# Patient Record
Sex: MALE | Race: WHITE | NOT HISPANIC OR LATINO | Employment: FULL TIME | URBAN - METROPOLITAN AREA
[De-identification: names, ages, dates, MRNs, and addresses within clinical notes are randomized per-mention and may not be internally consistent; named-entity substitution may affect disease eponyms.]

---

## 2023-10-25 ENCOUNTER — OFFICE VISIT (OUTPATIENT)
Dept: INTERNAL MEDICINE | Facility: CLINIC | Age: 28
End: 2023-10-25
Payer: COMMERCIAL

## 2023-10-25 VITALS
OXYGEN SATURATION: 98 % | HEART RATE: 84 BPM | HEIGHT: 75 IN | DIASTOLIC BLOOD PRESSURE: 98 MMHG | WEIGHT: 315 LBS | TEMPERATURE: 98.2 F | SYSTOLIC BLOOD PRESSURE: 150 MMHG | BODY MASS INDEX: 39.17 KG/M2

## 2023-10-25 DIAGNOSIS — Z11.59 NEED FOR HEPATITIS C SCREENING TEST: ICD-10-CM

## 2023-10-25 DIAGNOSIS — Z88.9 HISTORY OF SEVERE ALLERGIC REACTION: ICD-10-CM

## 2023-10-25 DIAGNOSIS — I10 PRIMARY HYPERTENSION: Primary | ICD-10-CM

## 2023-10-25 DIAGNOSIS — S46.912A STRAIN OF LEFT SHOULDER, INITIAL ENCOUNTER: ICD-10-CM

## 2023-10-25 RX ORDER — EPINEPHRINE 0.3 MG/.3ML
0.3 INJECTION SUBCUTANEOUS ONCE
Qty: 2 EACH | Refills: 1 | Status: SHIPPED | OUTPATIENT
Start: 2023-10-25 | End: 2023-10-25

## 2023-10-25 RX ORDER — TELMISARTAN 40 MG/1
40 TABLET ORAL DAILY
Qty: 30 TABLET | Refills: 1 | Status: SHIPPED | OUTPATIENT
Start: 2023-10-25

## 2023-10-25 NOTE — PROGRESS NOTES
"Subjective   Adis Wetzel is a 28 y.o. male.     Chief Complaint   Patient presents with    Allergies     Refill Epi pen      Hypertension     Restart medication.  Not taken in about 1.5 years.  Previously took telmasartan      Shoulder Pain     2 weeks.  No known injury.  Left shoulder pain.  Recently started working out       Visit Vitals  /98 (BP Location: Right arm, Patient Position: Sitting, Cuff Size: Adult)   Pulse 84   Temp 98.2 °F (36.8 °C)   Ht 189.2 cm (74.5\")   Wt (!) 143 kg (316 lb)   SpO2 98%   BMI 40.03 kg/m²         Allergic Reaction  This is a recurrent problem. Episode onset: 12 yrs ago. The problem occurs rarely. The problem is severe. The patient was exposed to an antibiotic and nuts (peanuts, dates, Cashews). The time of exposure was just prior to onset. Associated symptoms include difficulty breathing, itching, a rash, stridor, trouble swallowing and wheezing. Pertinent negatives include no abdominal pain, chest pain, chest pressure, coughing, diarrhea, drooling, eye itching, eye redness, eye watering, globus sensation, hyperventilation or vomiting. Swelling is present on the face and hands. Past treatments include diphenhydramine. The treatment provided significant relief. His past medical history is significant for food allergies and medication allergies.   Hypertension  This is a chronic problem. The current episode started more than 1 year ago. The problem is unchanged. The problem is uncontrolled. Pertinent negatives include no anxiety, blurred vision, chest pain, headaches, malaise/fatigue, neck pain, orthopnea, palpitations, peripheral edema, PND, shortness of breath or sweats. There are no associated agents to hypertension. Risk factors for coronary artery disease include obesity. Past treatments include angiotensin blockers. Current antihypertension treatment includes nothing. The current treatment provides significant improvement. Compliance problems include psychosocial issues. "  There is no history of angina, kidney disease, CAD/MI, CVA, heart failure, left ventricular hypertrophy, PVD or retinopathy. There is no history of sleep apnea or a thyroid problem.   Shoulder Injury   The incident occurred at the gym. The left shoulder is affected. The incident occurred more than 1 week ago (2 weeks ago). The injury mechanism was repetitive motion and overhead work. The quality of the pain is described as aching. The pain radiates to the left arm. The pain is mild. Pertinent negatives include no chest pain, muscle weakness, numbness or tingling. Exacerbated by: sitting raising shoulders at computer. He has tried nothing for the symptoms. The treatment provided no relief.     Pt declines PT.   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  Pt was on Micardis for HTN in 2020 and his primary doctor closed and he was off of medication for 1.5 yrs.      Pt had enlarged thryroid when he reduced salt intake.   Pt declines vaccines at this time  The following portions of the patient's history were reviewed and updated as appropriate: allergies, current medications, past family history, past medical history, past social history, past surgical history, and problem list.    Past Medical History:   Diagnosis Date    Allergic As child    Anaphylactic allergic reaction to peanuts, dates, and cashews.    Hypertension 06/01/2020    Was previously prescribed Telmisartan  40mg    Personal history of COVID-19 12/2021      Past Surgical History:   Procedure Laterality Date    WISDOM TOOTH EXTRACTION        Family History   Problem Relation Age of Onset    Hypertension Father     Hyperlipidemia Father     Anxiety disorder Brother     Heart disease Maternal Grandfather         Had bypass surgery in his mid 50's.      Social History     Socioeconomic History    Marital status: Single   Tobacco Use    Smoking status: Former     Packs/day: 1.00     Years: 3.00     Additional pack years: 0.00     Total pack years:  3.00     Types: Cigarettes     Start date: 2013     Quit date: 2016     Years since quittin.4    Tobacco comments:     Currently vapes minimal   Substance and Sexual Activity    Alcohol use: Never    Drug use: Never    Sexual activity: Never      Allergies   Allergen Reactions    Cashew Nut Anaphylaxis    Date Seed Extract [Zizyphus Jujuba] Anaphylaxis     Dates    Peanut-Containing Drug Products Anaphylaxis    Augmentin [Amoxicillin-Pot Clavulanate] Unknown - Low Severity     Childhood     Bactrim [Sulfamethoxazole-Trimethoprim] Unknown - Low Severity     childhood    Ceclor [Cefaclor] Unknown - Low Severity     childhood    Cedax [Ceftibuten] Unknown - Low Severity     childhood       Review of Systems   Constitutional:  Negative for malaise/fatigue.   HENT:  Positive for trouble swallowing. Negative for drooling.    Eyes:  Negative for blurred vision, redness and itching.   Respiratory:  Positive for wheezing and stridor. Negative for cough and shortness of breath.    Cardiovascular:  Negative for chest pain, palpitations, orthopnea and PND.   Gastrointestinal:  Negative for abdominal pain, diarrhea and vomiting.   Musculoskeletal:  Negative for neck pain.   Skin:  Positive for itching and rash.   Allergic/Immunologic: Positive for food allergies.   Neurological:  Negative for tingling, numbness and headaches.       Objective   Physical Exam  Vitals and nursing note reviewed.   Constitutional:       Appearance: He is well-developed.   HENT:      Head: Normocephalic.      Right Ear: External ear normal.      Left Ear: External ear normal.      Nose: Nose normal.   Eyes:      General: Lids are normal.      Conjunctiva/sclera: Conjunctivae normal.      Pupils: Pupils are equal, round, and reactive to light.   Neck:      Thyroid: No thyroid mass or thyromegaly.      Vascular: No carotid bruit.      Trachea: Trachea normal.   Cardiovascular:      Rate and Rhythm: Normal rate and regular rhythm.      Heart  sounds: No murmur heard.  Pulmonary:      Effort: Pulmonary effort is normal. No respiratory distress.      Breath sounds: Normal breath sounds. No decreased breath sounds, wheezing, rhonchi or rales.   Chest:      Chest wall: No tenderness.   Abdominal:      General: Bowel sounds are normal.      Palpations: Abdomen is soft.      Tenderness: There is no abdominal tenderness.   Musculoskeletal:         General: Normal range of motion.      Left shoulder: No swelling, deformity or tenderness. Normal range of motion.      Cervical back: Normal range of motion and neck supple.   Skin:     General: Skin is warm and dry.   Neurological:      Mental Status: He is alert and oriented to person, place, and time.   Psychiatric:         Behavior: Behavior normal.         Assessment & Plan   Problems Addressed this Visit          Cardiac and Vasculature    Hypertension - Primary    Relevant Medications    EPINEPHrine (EpiPen 2-Tripp) 0.3 MG/0.3ML solution auto-injector injection    telmisartan (MICARDIS) 40 MG tablet    Other Relevant Orders    TSH Rfx On Abnormal To Free T4    Comprehensive Metabolic Panel    Lipid Panel    CBC & Differential       Musculoskeletal and Injuries    RESOLVED: Strain of left shoulder     Other Visit Diagnoses       History of severe allergic reaction        Relevant Medications    EPINEPHrine (EpiPen 2-Tripp) 0.3 MG/0.3ML solution auto-injector injection    Other Relevant Orders    Ambulatory Referral to Allergy (Completed)    Need for hepatitis C screening test        Relevant Orders    HCV Antibody Rfx To Qnt PCR          Diagnoses         Codes Comments    Primary hypertension    -  Primary ICD-10-CM: I10  ICD-9-CM: 401.9     History of severe allergic reaction     ICD-10-CM: Z88.9  ICD-9-CM: V15.09     Strain of left shoulder, initial encounter     ICD-10-CM: S46.912A  ICD-9-CM: 840.9     Need for hepatitis C screening test     ICD-10-CM: Z11.59  ICD-9-CM: V73.89                        Current  Outpatient Medications:     EPINEPHrine (EpiPen 2-Tripp) 0.3 MG/0.3ML solution auto-injector injection, Inject 0.3 mL into the appropriate muscle as directed by prescriber 1 (One) Time for 1 dose., Disp: 2 each, Rfl: 1    telmisartan (MICARDIS) 40 MG tablet, Take 1 tablet by mouth Daily., Disp: 30 tablet, Rfl: 1    Return in about 4 weeks (around 11/22/2023), or if symptoms worsen or fail to improve, for Recheck bp with nurse 1 week.

## 2023-11-02 ENCOUNTER — TELEPHONE (OUTPATIENT)
Dept: INTERNAL MEDICINE | Facility: CLINIC | Age: 28
End: 2023-11-02
Payer: COMMERCIAL

## 2023-11-02 NOTE — TELEPHONE ENCOUNTER
Caller: Adis Wetzel    Relationship: Self    Best call back number: [phone]     PATIENT IS ON A NEW BLOOD PRESSURE MEDICATION AND WAS ADVISE TO CALL BACK WITH HIS READINGS. AVERAGE FOR THE LAST THREE OF DAYS IS: 124/83

## 2023-11-10 ENCOUNTER — TELEPHONE (OUTPATIENT)
Dept: INTERNAL MEDICINE | Facility: CLINIC | Age: 28
End: 2023-11-10
Payer: COMMERCIAL

## 2023-11-10 NOTE — TELEPHONE ENCOUNTER
Caller: Adis Wetzel    Relationship: Self    Best call back number: [phone]     What orders are you requesting (i.e. lab or imaging): LAB WORK ORIGINALLY ORDERED ON 10/25/23    Where will you receive your lab/imaging services:   [facility]  [address] [facility] [address] [place] [address]    PH: [phone]  FAX: [phone]    Additional notes: PLEASE ADVISE PATIENT WHEN THE LAB ORDERS HAVE BEEN SENT TO [facility] OR IF THERE ARE ANY QUESTIONS/CONCERNS.

## 2023-11-14 LAB
ALBUMIN SERPL-MCNC: 5 G/DL (ref 4.3–5.2)
ALBUMIN/GLOB SERPL: 2.1 {RATIO} (ref 1.2–2.2)
ALP SERPL-CCNC: 52 IU/L (ref 44–121)
ALT SERPL-CCNC: 31 IU/L (ref 0–44)
AST SERPL-CCNC: 24 IU/L (ref 0–40)
BASOPHILS # BLD AUTO: 0.1 X10E3/UL (ref 0–0.2)
BASOPHILS NFR BLD AUTO: 1 %
BILIRUB SERPL-MCNC: 0.7 MG/DL (ref 0–1.2)
BUN SERPL-MCNC: 11 MG/DL (ref 6–20)
BUN/CREAT SERPL: 11 (ref 9–20)
CALCIUM SERPL-MCNC: 10.2 MG/DL (ref 8.7–10.2)
CHLORIDE SERPL-SCNC: 98 MMOL/L (ref 96–106)
CHOLEST SERPL-MCNC: 207 MG/DL (ref 100–199)
CO2 SERPL-SCNC: 26 MMOL/L (ref 20–29)
CREAT SERPL-MCNC: 1.03 MG/DL (ref 0.76–1.27)
EGFRCR SERPLBLD CKD-EPI 2021: 100 ML/MIN/1.73
EOSINOPHIL # BLD AUTO: 0.4 X10E3/UL (ref 0–0.4)
EOSINOPHIL NFR BLD AUTO: 8 %
ERYTHROCYTE [DISTWIDTH] IN BLOOD BY AUTOMATED COUNT: 12 % (ref 11.6–15.4)
GLOBULIN SER CALC-MCNC: 2.4 G/DL (ref 1.5–4.5)
GLUCOSE SERPL-MCNC: 92 MG/DL (ref 70–99)
HCT VFR BLD AUTO: 48.4 % (ref 37.5–51)
HCV AB SERPL QL IA: NORMAL
HCV IGG SERPL QL IA: NON REACTIVE
HDLC SERPL-MCNC: 44 MG/DL
HGB BLD-MCNC: 16.6 G/DL (ref 13–17.7)
IMM GRANULOCYTES # BLD AUTO: 0 X10E3/UL (ref 0–0.1)
IMM GRANULOCYTES NFR BLD AUTO: 0 %
LDLC SERPL CALC-MCNC: 147 MG/DL (ref 0–99)
LYMPHOCYTES # BLD AUTO: 1.8 X10E3/UL (ref 0.7–3.1)
LYMPHOCYTES NFR BLD AUTO: 38 %
MCH RBC QN AUTO: 30.9 PG (ref 26.6–33)
MCHC RBC AUTO-ENTMCNC: 34.3 G/DL (ref 31.5–35.7)
MCV RBC AUTO: 90 FL (ref 79–97)
MONOCYTES # BLD AUTO: 0.6 X10E3/UL (ref 0.1–0.9)
MONOCYTES NFR BLD AUTO: 13 %
NEUTROPHILS # BLD AUTO: 1.8 X10E3/UL (ref 1.4–7)
NEUTROPHILS NFR BLD AUTO: 40 %
PLATELET # BLD AUTO: 221 X10E3/UL (ref 150–450)
POTASSIUM SERPL-SCNC: 4.9 MMOL/L (ref 3.5–5.2)
PROT SERPL-MCNC: 7.4 G/DL (ref 6–8.5)
RBC # BLD AUTO: 5.37 X10E6/UL (ref 4.14–5.8)
SODIUM SERPL-SCNC: 137 MMOL/L (ref 134–144)
TRIGL SERPL-MCNC: 87 MG/DL (ref 0–149)
TSH SERPL DL<=0.005 MIU/L-ACNC: 2.59 UIU/ML (ref 0.45–4.5)
VLDLC SERPL CALC-MCNC: 16 MG/DL (ref 5–40)
WBC # BLD AUTO: 4.6 X10E3/UL (ref 3.4–10.8)

## 2023-11-16 ENCOUNTER — OFFICE VISIT (OUTPATIENT)
Dept: INTERNAL MEDICINE | Facility: CLINIC | Age: 28
End: 2023-11-16
Payer: COMMERCIAL

## 2023-11-16 VITALS
SYSTOLIC BLOOD PRESSURE: 140 MMHG | BODY MASS INDEX: 38.42 KG/M2 | TEMPERATURE: 98.4 F | WEIGHT: 309 LBS | OXYGEN SATURATION: 97 % | DIASTOLIC BLOOD PRESSURE: 98 MMHG | HEIGHT: 75 IN | HEART RATE: 75 BPM

## 2023-11-16 DIAGNOSIS — I10 PRIMARY HYPERTENSION: ICD-10-CM

## 2023-11-16 PROCEDURE — 99213 OFFICE O/P EST LOW 20 MIN: CPT | Performed by: FAMILY MEDICINE

## 2023-11-16 RX ORDER — TELMISARTAN 40 MG/1
40 TABLET ORAL DAILY
Qty: 90 TABLET | Refills: 0 | Status: SHIPPED | OUTPATIENT
Start: 2023-11-16

## 2023-11-16 RX ORDER — EPINEPHRINE 0.3 MG/.3ML
INJECTION SUBCUTANEOUS SEE ADMIN INSTRUCTIONS
COMMUNITY
Start: 2023-10-25

## 2023-11-16 NOTE — PROGRESS NOTES
"Subjective   Adis Wetzel is a 28 y.o. male.     Chief Complaint   Patient presents with    Hypertension     4 week follow up    Allergies    Shoulder Pain       Visit Vitals  /98 (BP Location: Left arm, Patient Position: Sitting, Cuff Size: Adult)   Pulse 75   Temp 98.4 °F (36.9 °C)   Ht 189.2 cm (74.5\")   Wt (!) 140 kg (309 lb)   SpO2 97%   BMI 39.14 kg/m²       Wt Readings from Last 3 Encounters:   11/16/23 (!) 140 kg (309 lb)   10/25/23 (!) 143 kg (316 lb)         Hypertension  This is a chronic problem. The current episode started more than 1 year ago. The problem has been gradually improving since onset. The problem is controlled. Pertinent negatives include no anxiety, blurred vision, chest pain, headaches, malaise/fatigue, neck pain, orthopnea, palpitations, peripheral edema, PND, shortness of breath or sweats. There are no associated agents to hypertension. Risk factors for coronary artery disease include dyslipidemia, male gender and obesity. Current antihypertension treatment includes angiotensin blockers. The current treatment provides significant improvement. There are no compliance problems.  There is no history of angina, kidney disease, CAD/MI, CVA, heart failure, left ventricular hypertrophy, PVD or retinopathy. There is no history of sleep apnea or a thyroid problem.      Pt states that his blood pressure at home is running normal and brings his list.   Pt has pending allergy appt in January. Pt has hx of anaphylaxis.   Pt states that his shoulder pain has resolved.   Pt has changed his diet.   The following portions of the patient's history were reviewed and updated as appropriate: allergies, current medications, past family history, past medical history, past social history, past surgical history, and problem list.    Past Medical History:   Diagnosis Date    Allergic As child    Anaphylactic allergic reaction to peanuts, dates, and cashews.    Hypertension 06/01/2020    Was previously " prescribed Telmisartan  40mg    Personal history of COVID-19 2021      Past Surgical History:   Procedure Laterality Date    WISDOM TOOTH EXTRACTION        Family History   Problem Relation Age of Onset    Hypertension Father     Hyperlipidemia Father     Anxiety disorder Brother     Heart disease Maternal Grandfather         Had bypass surgery in his mid 50's.      Social History     Socioeconomic History    Marital status: Single   Tobacco Use    Smoking status: Former     Packs/day: 1.00     Years: 3.00     Additional pack years: 0.00     Total pack years: 3.00     Types: Cigarettes     Start date: 2013     Quit date: 2016     Years since quittin.4    Tobacco comments:     Currently vapes minimal   Substance and Sexual Activity    Alcohol use: Never    Drug use: Never    Sexual activity: Never      Allergies   Allergen Reactions    Cashew Nut Anaphylaxis    Date Seed Extract [Zizyphus Jujuba] Anaphylaxis     Dates    Peanut-Containing Drug Products Anaphylaxis    Augmentin [Amoxicillin-Pot Clavulanate] Unknown - Low Severity     Childhood     Bactrim [Sulfamethoxazole-Trimethoprim] Unknown - Low Severity     childhood    Ceclor [Cefaclor] Unknown - Low Severity     childhood    Cedax [Ceftibuten] Unknown - Low Severity     childhood       Review of Systems   Constitutional:  Negative for malaise/fatigue.   Eyes:  Negative for blurred vision.   Respiratory:  Negative for shortness of breath.    Cardiovascular:  Negative for chest pain, palpitations, orthopnea and PND.   Musculoskeletal:  Negative for neck pain.   Neurological:  Negative for headaches.       Objective   Physical Exam  Vitals and nursing note reviewed.   Constitutional:       Appearance: He is well-developed.   HENT:      Head: Normocephalic.      Right Ear: External ear normal.      Left Ear: External ear normal.      Nose: Nose normal.   Eyes:      Conjunctiva/sclera: Conjunctivae normal.      Pupils: Pupils are equal, round, and  reactive to light.   Cardiovascular:      Rate and Rhythm: Normal rate and regular rhythm.      Heart sounds: No murmur heard.  Pulmonary:      Effort: Pulmonary effort is normal.      Breath sounds: Normal breath sounds.   Abdominal:      General: Bowel sounds are normal.      Palpations: Abdomen is soft.   Musculoskeletal:         General: Normal range of motion.      Cervical back: Normal range of motion and neck supple.   Skin:     General: Skin is warm and dry.   Neurological:      Mental Status: He is alert and oriented to person, place, and time.   Psychiatric:         Behavior: Behavior normal.         Assessment & Plan   Problems Addressed this Visit          Cardiac and Vasculature    Hypertension    Relevant Medications    EPINEPHrine (EPIPEN) 0.3 MG/0.3ML solution auto-injector injection    telmisartan (MICARDIS) 40 MG tablet     Diagnoses         Codes Comments    Primary hypertension     ICD-10-CM: I10  ICD-9-CM: 401.9           Pt to track his BP at home.   Get flu shot at pharmacy  Continue to work on weigh loss           Current Outpatient Medications:     EPINEPHrine (EPIPEN) 0.3 MG/0.3ML solution auto-injector injection, Inject  into the appropriate muscle as directed by prescriber See Admin Instructions. inject syringe as directed for allergic reaction, Disp: , Rfl:     telmisartan (MICARDIS) 40 MG tablet, Take 1 tablet by mouth Daily., Disp: 90 tablet, Rfl: 0    Return in about 3 months (around 2/16/2024), or if symptoms worsen or fail to improve, for Recheck bp.  Answers submitted by the patient for this visit:  Primary Reason for Visit (Submitted on 11/15/2023)  What is the primary reason for your visit?: Other  Other (Submitted on 11/15/2023)  Please describe your symptoms.: No symptoms, this is a follow up appointment after taking BP meds for 3 weeks.  Have you had these symptoms before?: Yes  How long have you been having these symptoms?: Greater than 2 weeks  Please list any medications  you are currently taking for this condition.: Telmisartan 40mg    Recent Results (from the past 168 hour(s))   CBC & Differential    Collection Time: 11/13/23  8:42 AM   Result Value Ref Range    WBC 4.6 3.4 - 10.8 x10E3/uL    RBC 5.37 4.14 - 5.80 x10E6/uL    Hemoglobin 16.6 13.0 - 17.7 g/dL    Hematocrit 48.4 37.5 - 51.0 %    MCV 90 79 - 97 fL    MCH 30.9 26.6 - 33.0 pg    MCHC 34.3 31.5 - 35.7 g/dL    RDW 12.0 11.6 - 15.4 %    Platelets 221 150 - 450 x10E3/uL    Neutrophil Rel % 40 Not Estab. %    Lymphocyte Rel % 38 Not Estab. %    Monocyte Rel % 13 Not Estab. %    Eosinophil Rel % 8 Not Estab. %    Basophil Rel % 1 Not Estab. %    Neutrophils Absolute 1.8 1.4 - 7.0 x10E3/uL    Lymphocytes Absolute 1.8 0.7 - 3.1 x10E3/uL    Monocytes Absolute 0.6 0.1 - 0.9 x10E3/uL    Eosinophils Absolute 0.4 0.0 - 0.4 x10E3/uL    Basophils Absolute 0.1 0.0 - 0.2 x10E3/uL    Immature Granulocyte Rel % 0 Not Estab. %    Immature Grans Absolute 0.0 0.0 - 0.1 x10E3/uL   Comprehensive Metabolic Panel    Collection Time: 11/13/23  8:42 AM   Result Value Ref Range    Glucose 92 70 - 99 mg/dL    BUN 11 6 - 20 mg/dL    Creatinine 1.03 0.76 - 1.27 mg/dL    EGFR Result 100 >59 mL/min/1.73    BUN/Creatinine Ratio 11 9 - 20    Sodium 137 134 - 144 mmol/L    Potassium 4.9 3.5 - 5.2 mmol/L    Chloride 98 96 - 106 mmol/L    Total CO2 26 20 - 29 mmol/L    Calcium 10.2 8.7 - 10.2 mg/dL    Total Protein 7.4 6.0 - 8.5 g/dL    Albumin 5.0 4.3 - 5.2 g/dL    Globulin 2.4 1.5 - 4.5 g/dL    A/G Ratio 2.1 1.2 - 2.2    Total Bilirubin 0.7 0.0 - 1.2 mg/dL    Alkaline Phosphatase 52 44 - 121 IU/L    AST (SGOT) 24 0 - 40 IU/L    ALT (SGPT) 31 0 - 44 IU/L   Lipid Panel    Collection Time: 11/13/23  8:42 AM   Result Value Ref Range    Total Cholesterol 207 (H) 100 - 199 mg/dL    Triglycerides 87 0 - 149 mg/dL    HDL Cholesterol 44 >39 mg/dL    VLDL Cholesterol Gabriel 16 5 - 40 mg/dL    LDL Chol Calc (NIH) 147 (H) 0 - 99 mg/dL   HCV Antibody Rfx To Qnt PCR     Collection Time: 11/13/23  8:42 AM   Result Value Ref Range    Hepatitis C Ab Non Reactive Non Reactive   Interpretation:    Collection Time: 11/13/23  8:42 AM   Result Value Ref Range    Interpretation Comment    TSH Rfx On Abnormal To Free T4    Collection Time: 11/13/23  8:42 AM   Result Value Ref Range    TSH 2.590 0.450 - 4.500 uIU/mL

## 2024-02-15 ENCOUNTER — OFFICE VISIT (OUTPATIENT)
Dept: INTERNAL MEDICINE | Facility: CLINIC | Age: 29
End: 2024-02-15
Payer: COMMERCIAL

## 2024-02-15 VITALS
OXYGEN SATURATION: 98 % | HEART RATE: 72 BPM | BODY MASS INDEX: 38.15 KG/M2 | WEIGHT: 306.8 LBS | TEMPERATURE: 96.8 F | SYSTOLIC BLOOD PRESSURE: 140 MMHG | DIASTOLIC BLOOD PRESSURE: 88 MMHG | HEIGHT: 75 IN

## 2024-02-15 DIAGNOSIS — I10 PRIMARY HYPERTENSION: ICD-10-CM

## 2024-02-15 PROCEDURE — 99213 OFFICE O/P EST LOW 20 MIN: CPT | Performed by: FAMILY MEDICINE

## 2024-02-15 RX ORDER — TELMISARTAN 40 MG/1
40 TABLET ORAL DAILY
Qty: 90 TABLET | Refills: 1 | Status: SHIPPED | OUTPATIENT
Start: 2024-02-15

## 2024-08-12 DIAGNOSIS — I10 PRIMARY HYPERTENSION: ICD-10-CM

## 2024-08-12 RX ORDER — TELMISARTAN 40 MG/1
40 TABLET ORAL DAILY
Qty: 90 TABLET | Refills: 0 | Status: SHIPPED | OUTPATIENT
Start: 2024-08-12

## 2024-08-12 NOTE — TELEPHONE ENCOUNTER
Rx Refill Note  Requested Prescriptions     Pending Prescriptions Disp Refills    telmisartan (MICARDIS) 40 MG tablet 90 tablet 1     Sig: Take 1 tablet by mouth Daily.      Last office visit with prescribing clinician: 2/15/2024   Last telemedicine visit with prescribing clinician: Visit date not found   Next office visit with prescribing clinician: 1/16/2025       Lety Huerta MA  08/12/24, 14:59 EDT

## 2024-08-12 NOTE — TELEPHONE ENCOUNTER
Caller: Adis Wetzel    Relationship: Self    Best call back number: [phone]    Requested Prescriptions:   Requested Prescriptions     Pending Prescriptions Disp Refills    telmisartan (MICARDIS) 40 MG tablet 90 tablet 1     Sig: Take 1 tablet by mouth Daily.        Pharmacy where request should be sent: [id number] - [place], [place] - [address] [facility] [address] - [phone]  - [phone] FX     Last office visit with prescribing clinician: 2/15/2024   Last telemedicine visit with prescribing clinician: Visit date not found   Next office visit with prescribing clinician: 1/16/2025     Additional details provided by patient: THE PATIENT HAS 11 DAYS LEFT     Does the patient have less than a 3 day supply:  [] Yes  [x] No    Would you like a call back once the refill request has been completed: [] Yes [x] No    If the office needs to give you a call back, can they leave a voicemail: [] Yes [x] No    Michelle Durand Rep   08/12/24 13:48 EDT

## 2024-09-23 ENCOUNTER — TELEPHONE (OUTPATIENT)
Dept: INTERNAL MEDICINE | Facility: CLINIC | Age: 29
End: 2024-09-23
Payer: COMMERCIAL

## 2024-09-23 DIAGNOSIS — I10 PRIMARY HYPERTENSION: Primary | ICD-10-CM

## 2024-11-14 ENCOUNTER — TELEPHONE (OUTPATIENT)
Dept: INTERNAL MEDICINE | Facility: CLINIC | Age: 29
End: 2024-11-14
Payer: COMMERCIAL

## 2024-11-14 DIAGNOSIS — I10 PRIMARY HYPERTENSION: ICD-10-CM

## 2024-11-14 RX ORDER — TELMISARTAN 40 MG/1
40 TABLET ORAL DAILY
Qty: 90 TABLET | Refills: 0 | Status: SHIPPED | OUTPATIENT
Start: 2024-11-14

## 2024-11-14 NOTE — TELEPHONE ENCOUNTER
Follow-up potassium was normal. Lab orders have been faxed to [facility], [address] [place] [address], [place] [address], [place]  [address], Fax#: [phone].  Patient has been notified.

## 2024-11-14 NOTE — TELEPHONE ENCOUNTER
Caller: Adis Wetzel    Relationship: Self    Best call back number: [phone]     Requested Prescriptions:   Requested Prescriptions     Pending Prescriptions Disp Refills    telmisartan (MICARDIS) 40 MG tablet 90 tablet 0     Sig: Take 1 tablet by mouth Daily.        Pharmacy where request should be sent: [id number] - [address] - [phone]  - [phone] FX     Last office visit with prescribing clinician: 2/15/2024   Last telemedicine visit with prescribing clinician: Visit date not found   Next office visit with prescribing clinician: 1/16/2025     Additional details provided by patient:     Does the patient have less than a 3 day supply:  [] Yes  [] No    Would you like a call back once the refill request has been completed: [x] Yes [] No    If the office needs to give you a call back, can they leave a voicemail: [x] Yes [] No    Michelle Vidal Rep   11/14/24 10:33 EST

## 2024-11-14 NOTE — TELEPHONE ENCOUNTER
Caller: Adis Wetzel    Relationship: Self    Best call back number: [phone]     What is the best time to reach you: ANYTIME     Who are you requesting to speak with (clinical staff, provider,  specific staff member): CLINICAL STAFF    What was the call regarding: PATIENT IS CALLING TO REQUEST THAT HIS LAB ORDERS BE SENT TO [facility]     Is it okay if the provider responds through [facility]: YES

## 2024-11-14 NOTE — TELEPHONE ENCOUNTER
Rx Refill Note  Requested Prescriptions     Pending Prescriptions Disp Refills    telmisartan (MICARDIS) 40 MG tablet 90 tablet 0     Sig: Take 1 tablet by mouth Daily.      Last office visit with prescribing clinician: 2/15/2024   Last telemedicine visit with prescribing clinician: Visit date not found   Next office visit with prescribing clinician: 1/16/2025       Yumiko Grant MA  11/14/24, 17:20 EST

## 2025-02-10 DIAGNOSIS — I10 PRIMARY HYPERTENSION: ICD-10-CM

## 2025-02-10 RX ORDER — TELMISARTAN 40 MG/1
40 TABLET ORAL DAILY
Qty: 90 TABLET | Refills: 0 | Status: CANCELLED | OUTPATIENT
Start: 2025-02-10

## 2025-02-10 NOTE — TELEPHONE ENCOUNTER
Caller: Adis Wetzel    Relationship: Self    Best call back number: [phone]     Requested Prescriptions:   Requested Prescriptions     Pending Prescriptions Disp Refills    telmisartan (MICARDIS) 40 MG tablet 90 tablet 0     Sig: Take 1 tablet by mouth Daily.        Pharmacy where request should be sent: [id number] - [facility] [address] [facility] [address] - [phone]  - [phone] FX     Last office visit with prescribing clinician: 2/15/2024   Last telemedicine visit with prescribing clinician: Visit date not found   Next office visit with prescribing clinician: 5/22/2025     Additional details provided by patient: PATIENT WOULD LIKE A QUANTITY  IF POSSIBLE TO DO UNTIL HIS NEXT SCHEDULED ANNUAL PHYSICAL ON 5/22/2025 OR 1 REFILL ADDED TO THE NEW PRESCRIPTION.     Does the patient have less than a 3 day supply:  [x] Yes  [] No    Would you like a call back once the refill request has been completed: [] Yes [x] No    If the office needs to give you a call back, can they leave a voicemail: [] Yes [x] No    Michelle Bhatti Rep   02/10/25 11:08 EST

## 2025-02-10 NOTE — TELEPHONE ENCOUNTER
Left message on voicemail patient is due now for an appointment.  Last seen in February 2024 and did not follow up in 6 months like office note asked.  He will need appointment and labs for this medication to be refilled.

## 2025-02-12 DIAGNOSIS — I10 PRIMARY HYPERTENSION: ICD-10-CM

## 2025-02-12 RX ORDER — TELMISARTAN 40 MG/1
40 TABLET ORAL DAILY
Qty: 90 TABLET | Refills: 1 | Status: SHIPPED | OUTPATIENT
Start: 2025-02-12

## 2025-02-12 NOTE — TELEPHONE ENCOUNTER
Rx Refill Note  Requested Prescriptions     Pending Prescriptions Disp Refills    telmisartan (MICARDIS) 40 MG tablet 90 tablet 0     Sig: Take 1 tablet by mouth Daily.      Last office visit with prescribing clinician: 2/15/2024   Last telemedicine visit with prescribing clinician: Visit date not found   Next office visit with prescribing clinician: 5/22/2025                         Would you like a call back once the refill request has been completed: [] Yes [] No    If the office needs to give you a call back, can they leave a voicemail: [] Yes [] No    Tabitha Addison  02/12/25, 14:56 EST

## 2025-02-12 NOTE — TELEPHONE ENCOUNTER
Caller: Adis Wetzel    Relationship: Self    Best call back number: [phone]     Requested Prescriptions:   Requested Prescriptions     Pending Prescriptions Disp Refills    telmisartan (MICARDIS) 40 MG tablet 90 tablet 0     Sig: Take 1 tablet by mouth Daily.        Pharmacy where request should be sent: [id number] - [address] - [phone]  - [phone]     Last office visit with prescribing clinician: 2/15/2024   Last telemedicine visit with prescribing clinician: Visit date not found   Next office visit with prescribing clinician: 5/22/2025     Additional details provided by patient: PATIENT HAS 1 PILL LEFT. HE HAS HIS ANNUAL PHYSICAL SCHEDULED FOR 05/22/25. HE NEEDS ENOUGH MEDICATION TO GET HIM THROUGH TIL THE APPOINTMENT.     Does the patient have less than a 3 day supply:  [x] Yes  [] No    Would you like a call back once the refill request has been completed: [] Yes [x] No    If the office needs to give you a call back, can they leave a voicemail: [] Yes [x] No    Michelle Cope Rep   02/12/25 14:30 EST

## 2025-05-08 ENCOUNTER — TELEPHONE (OUTPATIENT)
Dept: INTERNAL MEDICINE | Facility: CLINIC | Age: 30
End: 2025-05-08
Payer: COMMERCIAL

## 2025-05-08 DIAGNOSIS — Z00.00 LABORATORY EXAM ORDERED AS PART OF ROUTINE GENERAL MEDICAL EXAMINATION: Primary | ICD-10-CM

## 2025-05-08 NOTE — TELEPHONE ENCOUNTER
PATIENT HAS CALLED REQUESTING IF LAB ORDERS CAN BE SENT OVER TO [email or web address] IN PREPARATION FOR HIS UPCOMING PHYSICAL. ADDRESS FOR [email or web address] IS [address] [place] [address]. FAX NUMBER [phone]. PATIENT IS REQUESTING A CALL BACK ONCE ORDERS HAVE BEEN SENT.    CALL BACK NUMBER [phone]

## 2025-05-14 LAB
ALBUMIN SERPL-MCNC: 4.6 G/DL (ref 4.1–5.1)
ALP SERPL-CCNC: 63 IU/L (ref 44–121)
ALT SERPL-CCNC: 27 IU/L (ref 0–44)
AMBIG ABBREV CMP14 DEFAULT: NORMAL
AMBIG ABBREV LP DEFAULT: NORMAL
AST SERPL-CCNC: 19 IU/L (ref 0–40)
BASOPHILS # BLD AUTO: 0.1 X10E3/UL (ref 0–0.2)
BASOPHILS NFR BLD AUTO: 1 %
BILIRUB SERPL-MCNC: 0.7 MG/DL (ref 0–1.2)
BUN SERPL-MCNC: 12 MG/DL (ref 6–20)
BUN/CREAT SERPL: 12 (ref 9–20)
CALCIUM SERPL-MCNC: 10 MG/DL (ref 8.7–10.2)
CHLORIDE SERPL-SCNC: 100 MMOL/L (ref 96–106)
CHOLEST SERPL-MCNC: 195 MG/DL (ref 100–199)
CO2 SERPL-SCNC: 25 MMOL/L (ref 20–29)
CREAT SERPL-MCNC: 1.02 MG/DL (ref 0.76–1.27)
EGFRCR SERPLBLD CKD-EPI 2021: 101 ML/MIN/1.73
EOSINOPHIL # BLD AUTO: 1.2 X10E3/UL (ref 0–0.4)
EOSINOPHIL NFR BLD AUTO: 20 %
ERYTHROCYTE [DISTWIDTH] IN BLOOD BY AUTOMATED COUNT: 12.1 % (ref 11.6–15.4)
GLOBULIN SER CALC-MCNC: 2.3 G/DL (ref 1.5–4.5)
GLUCOSE SERPL-MCNC: 94 MG/DL (ref 70–99)
HCT VFR BLD AUTO: 47.6 % (ref 37.5–51)
HDLC SERPL-MCNC: 56 MG/DL
HGB BLD-MCNC: 15.6 G/DL (ref 13–17.7)
IMM GRANULOCYTES # BLD AUTO: 0 X10E3/UL (ref 0–0.1)
IMM GRANULOCYTES NFR BLD AUTO: 0 %
LDLC SERPL CALC-MCNC: 121 MG/DL (ref 0–99)
LYMPHOCYTES # BLD AUTO: 2 X10E3/UL (ref 0.7–3.1)
LYMPHOCYTES NFR BLD AUTO: 35 %
MCH RBC QN AUTO: 31.7 PG (ref 26.6–33)
MCHC RBC AUTO-ENTMCNC: 32.8 G/DL (ref 31.5–35.7)
MCV RBC AUTO: 97 FL (ref 79–97)
MONOCYTES # BLD AUTO: 0.6 X10E3/UL (ref 0.1–0.9)
MONOCYTES NFR BLD AUTO: 11 %
NEUTROPHILS # BLD AUTO: 1.9 X10E3/UL (ref 1.4–7)
NEUTROPHILS NFR BLD AUTO: 33 %
PLATELET # BLD AUTO: 198 X10E3/UL (ref 150–450)
POTASSIUM SERPL-SCNC: 4.1 MMOL/L (ref 3.5–5.2)
PROT SERPL-MCNC: 6.9 G/DL (ref 6–8.5)
RBC # BLD AUTO: 4.92 X10E6/UL (ref 4.14–5.8)
SODIUM SERPL-SCNC: 140 MMOL/L (ref 134–144)
TRIGL SERPL-MCNC: 98 MG/DL (ref 0–149)
TSH SERPL DL<=0.005 MIU/L-ACNC: 2.86 UIU/ML (ref 0.45–4.5)
VLDLC SERPL CALC-MCNC: 18 MG/DL (ref 5–40)
WBC # BLD AUTO: 5.7 X10E3/UL (ref 3.4–10.8)

## 2025-05-15 ENCOUNTER — RESULTS FOLLOW-UP (OUTPATIENT)
Dept: INTERNAL MEDICINE | Facility: CLINIC | Age: 30
End: 2025-05-15
Payer: COMMERCIAL

## 2025-05-22 ENCOUNTER — OFFICE VISIT (OUTPATIENT)
Dept: INTERNAL MEDICINE | Facility: CLINIC | Age: 30
End: 2025-05-22
Payer: COMMERCIAL

## 2025-05-22 VITALS
SYSTOLIC BLOOD PRESSURE: 136 MMHG | HEIGHT: 75 IN | BODY MASS INDEX: 34.44 KG/M2 | DIASTOLIC BLOOD PRESSURE: 86 MMHG | HEART RATE: 77 BPM | TEMPERATURE: 97.8 F | OXYGEN SATURATION: 99 % | WEIGHT: 277 LBS

## 2025-05-22 DIAGNOSIS — Z00.00 ANNUAL PHYSICAL EXAM: Primary | ICD-10-CM

## 2025-05-22 LAB
BILIRUB BLD-MCNC: NEGATIVE MG/DL
CLARITY, POC: CLEAR
COLOR UR: YELLOW
EXPIRATION DATE: NORMAL
GLUCOSE UR STRIP-MCNC: NEGATIVE MG/DL
KETONES UR QL: NEGATIVE
LEUKOCYTE EST, POC: NEGATIVE
Lab: NORMAL
NITRITE UR-MCNC: NEGATIVE MG/ML
PH UR: 8 [PH] (ref 5–8)
PROT UR STRIP-MCNC: NEGATIVE MG/DL
RBC # UR STRIP: NEGATIVE /UL
SP GR UR: 1 (ref 1–1.03)
UROBILINOGEN UR QL: NORMAL

## 2025-05-22 NOTE — PROGRESS NOTES
"Subjective   Adis Wetzel is a 29 y.o. male.         Chief Complaint   Patient presents with    Annual Exam       Visit Vitals  /86 (BP Location: Left arm, Patient Position: Sitting, Cuff Size: Adult)   Pulse 77   Temp 97.8 °F (36.6 °C)   Ht 189.2 cm (74.5\")   Wt 126 kg (277 lb)   SpO2 99%   BMI 35.09 kg/m²       Wt Readings from Last 3 Encounters:   05/22/25 126 kg (277 lb)   02/15/24 (!) 139 kg (306 lb 12.8 oz)   11/16/23 (!) 140 kg (309 lb)         Hypertension  Chronicity:  Chronic  Onset:  More than 1 year ago  Progression since onset:  Stable  Condition status:  Controlled  Associated symptoms: no anxiety, no blurred vision, no chest pain, no headaches, no malaise/fatigue, no neck pain, no orthopnea, no palpitations, no peripheral edema, no PND, no shortness of breath, no sweats, no dyspnea with exertion and no dizziness    Agents associated with hypertension:  No associated agents  CAD risks:  Male gender and obesity  Past treatments:  Angiotensin blockers  Current therapy:  Angiotensin blockers  Improvement on treatment:  Significant  Compliance problems:  No compliance problems  End-organ damage: no angina, no kidney disease, no CAD/MI, no CVA, no heart failure, no left ventricular hypertrophy, no PVD and no retinopathy    Identifiable causes: no sleep apnea and no thyroid problem       Pt here for annual exam.  Pt is working on weight loss and is exercising regularly and tracking blood sugars, which have been normal. Pt's fasting sugars the past 3 months have been 87. /77 at home.    The following portions of the patient's history were reviewed and updated as appropriate: allergies, current medications, past family history, past medical history, past social history, past surgical history, and problem list.    Past Medical History:   Diagnosis Date    Allergic As child    Anaphylactic allergic reaction to peanuts, dates, and cashews.    Hypertension 06/01/2020    Was previously prescribed " Telmisartan  40mg    Personal history of COVID-19 2021      Past Surgical History:   Procedure Laterality Date    WISDOM TOOTH EXTRACTION        Family History   Problem Relation Age of Onset    Hypertension Father     Hyperlipidemia Father     Anxiety disorder Brother     Colon cancer Maternal Aunt         , radiation exposure    Heart disease Maternal Grandfather         Had bypass surgery in his mid 50's.      Social History     Socioeconomic History    Marital status: Single   Tobacco Use    Smoking status: Former     Current packs/day: 0.00     Average packs/day: 1 pack/day for 3.4 years (3.4 ttl pk-yrs)     Types: Cigarettes     Start date: 2013     Quit date: 2016     Years since quittin.9    Smokeless tobacco: Former    Tobacco comments:        Vaping Use    Vaping status: Former    Substances: Nicotine    Devices: Refillable tank   Substance and Sexual Activity    Alcohol use: Never    Drug use: Never    Sexual activity: Never      Allergies   Allergen Reactions    Cashew Nut Anaphylaxis    Date Seed Extract [Zizyphus Jujuba] Anaphylaxis     Dates    Peanut-Containing Drug Products Anaphylaxis    Augmentin [Amoxicillin-Pot Clavulanate] Unknown - Low Severity     Childhood     Bactrim [Sulfamethoxazole-Trimethoprim] Unknown - Low Severity     childhood    Ceclor [Cefaclor] Unknown - Low Severity     childhood    Cedax [Ceftibuten] Unknown - Low Severity     childhood       Review of Systems   Constitutional:  Negative for activity change, appetite change, chills, diaphoresis, fatigue, fever, malaise/fatigue and unexpected weight change.   HENT:  Negative for congestion, dental problem, drooling, ear discharge, ear pain, facial swelling, hearing loss, mouth sores, nosebleeds, postnasal drip, rhinorrhea, sinus pressure, sinus pain, sneezing, sore throat, tinnitus, trouble swallowing and voice change.    Eyes:  Negative for blurred vision, photophobia, pain, discharge, redness,  itching and visual disturbance.   Respiratory:  Negative for apnea, cough, choking, chest tightness, shortness of breath, wheezing and stridor.    Cardiovascular:  Negative for chest pain, palpitations, orthopnea, leg swelling and PND.   Gastrointestinal:  Negative for abdominal distention, abdominal pain, anal bleeding, blood in stool, constipation, diarrhea, nausea, rectal pain and vomiting.   Endocrine: Negative for cold intolerance, heat intolerance, polydipsia, polyphagia and polyuria.   Genitourinary:  Negative for decreased urine volume, difficulty urinating, dysuria, enuresis, flank pain, frequency, genital sores, hematuria, penile discharge, penile pain, penile swelling, scrotal swelling, testicular pain and urgency.   Musculoskeletal:  Negative for arthralgias, back pain, gait problem, joint swelling, myalgias, neck pain and neck stiffness.   Skin:  Negative for color change, pallor, rash and wound.   Allergic/Immunologic: Positive for food allergies. Negative for environmental allergies and immunocompromised state.   Neurological:  Negative for dizziness, tremors, seizures, syncope, facial asymmetry, speech difficulty, weakness, light-headedness, numbness and headaches.   Hematological:  Negative for adenopathy. Does not bruise/bleed easily.   Psychiatric/Behavioral:  Negative for agitation, behavioral problems, confusion, decreased concentration, dysphoric mood, hallucinations, self-injury, sleep disturbance and suicidal ideas. The patient is not nervous/anxious and is not hyperactive.        Objective   Physical Exam  Vitals and nursing note reviewed.   Constitutional:       General: He is not in acute distress.     Appearance: He is well-developed. He is not diaphoretic.   HENT:      Head: Normocephalic and atraumatic.      Right Ear: Tympanic membrane, ear canal and external ear normal.      Left Ear: Tympanic membrane, ear canal and external ear normal.      Nose: Nose normal.      Mouth/Throat:       Lips: Pink.      Mouth: Mucous membranes are moist. Mucous membranes are not pale, not dry and not cyanotic. No injury.      Dentition: Normal dentition.      Tongue: No lesions.      Palate: No mass.      Pharynx: Uvula midline. No pharyngeal swelling, oropharyngeal exudate, posterior oropharyngeal erythema or uvula swelling.   Eyes:      General: Lids are normal. No scleral icterus.        Right eye: No foreign body, discharge or hordeolum.         Left eye: No foreign body, discharge or hordeolum.      Extraocular Movements:      Right eye: Normal extraocular motion and no nystagmus.      Left eye: Normal extraocular motion and no nystagmus.      Conjunctiva/sclera: Conjunctivae normal.      Right eye: Right conjunctiva is not injected. No chemosis, exudate or hemorrhage.     Left eye: Left conjunctiva is not injected. No chemosis, exudate or hemorrhage.     Pupils: Pupils are equal, round, and reactive to light.   Neck:      Thyroid: No thyroid mass or thyromegaly.      Vascular: No carotid bruit.      Trachea: Trachea normal. No tracheal deviation.   Cardiovascular:      Rate and Rhythm: Normal rate and regular rhythm.      Heart sounds: Normal heart sounds. No murmur heard.     No friction rub. No gallop.   Pulmonary:      Effort: Pulmonary effort is normal. No respiratory distress.      Breath sounds: Normal breath sounds. No decreased breath sounds, wheezing, rhonchi or rales.   Chest:      Chest wall: No deformity or tenderness. There is no dullness to percussion.   Breasts:     Breasts are symmetrical.      Right: Normal. No swelling, bleeding, inverted nipple, mass, nipple discharge, skin change or tenderness.      Left: Normal. No swelling, bleeding, inverted nipple, mass, nipple discharge, skin change or tenderness.   Abdominal:      General: Bowel sounds are normal. There is no distension.      Palpations: Abdomen is soft. There is no hepatomegaly, splenomegaly, mass or pulsatile mass.      Tenderness:  There is no abdominal tenderness. There is no guarding or rebound.      Hernia: No hernia is present.   Musculoskeletal:         General: No tenderness or deformity. Normal range of motion.      Cervical back: Normal range of motion and neck supple.      Right lower leg: No edema.      Left lower leg: No edema.   Lymphadenopathy:      Head:      Right side of head: No submandibular adenopathy.      Left side of head: No submandibular adenopathy.      Cervical: No cervical adenopathy.      Right cervical: No superficial, deep or posterior cervical adenopathy.     Left cervical: No superficial, deep or posterior cervical adenopathy.      Upper Body:      Right upper body: No supraclavicular, axillary or pectoral adenopathy.      Left upper body: No supraclavicular, axillary or pectoral adenopathy.   Skin:     General: Skin is warm and dry.      Findings: No rash.      Nails: There is no clubbing.   Neurological:      Mental Status: He is alert and oriented to person, place, and time.      Cranial Nerves: No cranial nerve deficit.      Sensory: No sensory deficit.      Coordination: Coordination normal.      Deep Tendon Reflexes: Reflexes are normal and symmetric.      Reflex Scores:       Bicep reflexes are 2+ on the right side and 2+ on the left side.       Brachioradialis reflexes are 2+ on the right side and 2+ on the left side.       Patellar reflexes are 2+ on the right side and 2+ on the left side.  Psychiatric:         Attention and Perception: Attention and perception normal.         Mood and Affect: Mood and affect normal.         Speech: Speech normal.         Behavior: Behavior normal.         Thought Content: Thought content normal.         Cognition and Memory: Cognition and memory normal.         Judgment: Judgment normal.         Assessment & Plan   Problems Addressed this Visit    None  Visit Diagnoses         Annual physical exam    -  Primary    Relevant Orders    POCT urinalysis dipstick, automated  (Completed)          Diagnoses         Codes Comments      Annual physical exam    -  Primary ICD-10-CM: Z00.00  ICD-9-CM: V70.0             Please follow a low animal fat diet that is also low in sugar, low in junk food, low in sweet drinks and low in alcohol.  Please increase the amount of fiber in your diet as well as increasing your daily exercise, such as walking.    Ok to cut Micardis to 20 mg daily if BP staying below 105-110.          Current Outpatient Medications:     EPINEPHrine (EPIPEN) 0.3 MG/0.3ML solution auto-injector injection, Inject  into the appropriate muscle as directed by prescriber See Admin Instructions. inject syringe as directed for allergic reaction, Disp: , Rfl:     telmisartan (MICARDIS) 40 MG tablet, Take 1 tablet by mouth Daily., Disp: 90 tablet, Rfl: 1    Return in about 6 months (around 11/22/2025), or if symptoms worsen or fail to improve, for Recheck bp.    Recent Results (from the past 2 weeks)   CBC / Diff Ambiguous Default    Collection Time: 05/13/25  8:28 AM   Result Value Ref Range    WBC 5.7 3.4 - 10.8 x10E3/uL    RBC 4.92 4.14 - 5.80 x10E6/uL    Hemoglobin 15.6 13.0 - 17.7 g/dL    Hematocrit 47.6 37.5 - 51.0 %    MCV 97 79 - 97 fL    MCH 31.7 26.6 - 33.0 pg    MCHC 32.8 31.5 - 35.7 g/dL    RDW 12.1 11.6 - 15.4 %    Platelets 198 150 - 450 x10E3/uL    Neutrophil Rel % 33 Not Estab. %    Lymphocyte Rel % 35 Not Estab. %    Monocyte Rel % 11 Not Estab. %    Eosinophil Rel % 20 Not Estab. %    Basophil Rel % 1 Not Estab. %    Neutrophils Absolute 1.9 1.4 - 7.0 x10E3/uL    Lymphocytes Absolute 2.0 0.7 - 3.1 x10E3/uL    Monocytes Absolute 0.6 0.1 - 0.9 x10E3/uL    Eosinophils Absolute 1.2 (H) 0.0 - 0.4 x10E3/uL    Basophils Absolute 0.1 0.0 - 0.2 x10E3/uL    Immature Granulocyte Rel % 0 Not Estab. %    Immature Grans Absolute 0.0 0.0 - 0.1 x10E3/uL   Comprehensive Metabolic Panel    Collection Time: 05/13/25  8:28 AM   Result Value Ref Range    Glucose 94 70 - 99 mg/dL    BUN 12  6 - 20 mg/dL    Creatinine 1.02 0.76 - 1.27 mg/dL    EGFR Result 101 >59 mL/min/1.73    BUN/Creatinine Ratio 12 9 - 20    Sodium 140 134 - 144 mmol/L    Potassium 4.1 3.5 - 5.2 mmol/L    Chloride 100 96 - 106 mmol/L    Total CO2 25 20 - 29 mmol/L    Calcium 10.0 8.7 - 10.2 mg/dL    Total Protein 6.9 6.0 - 8.5 g/dL    Albumin 4.6 4.1 - 5.1 g/dL    Globulin 2.3 1.5 - 4.5 g/dL    Total Bilirubin 0.7 0.0 - 1.2 mg/dL    Alkaline Phosphatase 63 44 - 121 IU/L    AST (SGOT) 19 0 - 40 IU/L    ALT (SGPT) 27 0 - 44 IU/L   Lipid Panel    Collection Time: 05/13/25  8:28 AM   Result Value Ref Range    Total Cholesterol 195 100 - 199 mg/dL    Triglycerides 98 0 - 149 mg/dL    HDL Cholesterol 56 >39 mg/dL    VLDL Cholesterol Gabriel 18 5 - 40 mg/dL    LDL Chol Calc (NIH) 121 (H) 0 - 99 mg/dL   TSH Rfx On Abnormal To Free T4    Collection Time: 05/13/25  8:28 AM   Result Value Ref Range    TSH 2.860 0.450 - 4.500 uIU/mL   Joseph Abbclaytonv CMP14 Default    Collection Time: 05/13/25  8:28 AM   Result Value Ref Range    Joseph Abbrev CMP14 Default Comment    Joseph Abbrev LP Default    Collection Time: 05/13/25  8:28 AM   Result Value Ref Range    Joseph Abbrev LP Default Comment    POCT urinalysis dipstick, automated    Collection Time: 05/22/25  9:59 AM    Specimen: Urine   Result Value Ref Range    Color Yellow Yellow, Straw, Dark Yellow, Marissa    Clarity, UA Clear Clear    Specific Gravity  1.005 1.005 - 1.030    pH, Urine 8.0 5.0 - 8.0    Leukocytes Negative Negative    Nitrite, UA Negative Negative    Protein, POC Negative Negative mg/dL    Glucose, UA Negative Negative mg/dL    Ketones, UA Negative Negative    Urobilinogen, UA Normal Normal, 0.2 E.U./dL    Bilirubin Negative Negative    Blood, UA Negative Negative    Lot Number [id number]     Expiration Date 10/5/2026

## 2025-07-18 DIAGNOSIS — I10 PRIMARY HYPERTENSION: ICD-10-CM

## 2025-07-18 RX ORDER — TELMISARTAN 40 MG/1
40 TABLET ORAL DAILY
Qty: 90 TABLET | Refills: 1 | Status: SHIPPED | OUTPATIENT
Start: 2025-07-18

## 2025-07-18 NOTE — TELEPHONE ENCOUNTER
Rx Refill Note  Requested Prescriptions     Pending Prescriptions Disp Refills    telmisartan (MICARDIS) 40 MG tablet [Pharmacy Med Name: Telmisartan Oral Tablet 40 MG] 90 tablet 0     Sig: TAKE 1 TABLET BY MOUTH EVERY DAY      Last office visit with prescribing clinician: 5/22/2025   Last telemedicine visit with prescribing clinician: Visit date not found   Next office visit with prescribing clinician: 12/1/2025   Last filled:02/12/25      Lety Huerta MA  07/18/25, 11:13 EDT